# Patient Record
Sex: MALE | Race: NATIVE HAWAIIAN OR OTHER PACIFIC ISLANDER | Employment: OTHER | URBAN - METROPOLITAN AREA
[De-identification: names, ages, dates, MRNs, and addresses within clinical notes are randomized per-mention and may not be internally consistent; named-entity substitution may affect disease eponyms.]

---

## 2017-09-26 ENCOUNTER — OP HISTORICAL/CONVERTED ENCOUNTER (OUTPATIENT)
Dept: OTHER | Age: 67
End: 2017-09-26

## 2018-09-17 ENCOUNTER — OP HISTORICAL/CONVERTED ENCOUNTER (OUTPATIENT)
Dept: OTHER | Age: 68
End: 2018-09-17

## 2019-05-28 ENCOUNTER — OP HISTORICAL/CONVERTED ENCOUNTER (OUTPATIENT)
Dept: OTHER | Age: 69
End: 2019-05-28

## 2019-06-11 ENCOUNTER — OP HISTORICAL/CONVERTED ENCOUNTER (OUTPATIENT)
Dept: OTHER | Age: 69
End: 2019-06-11

## 2019-09-17 ENCOUNTER — OP HISTORICAL/CONVERTED ENCOUNTER (OUTPATIENT)
Dept: OTHER | Age: 69
End: 2019-09-17

## 2019-09-30 ENCOUNTER — OP HISTORICAL/CONVERTED ENCOUNTER (OUTPATIENT)
Dept: OTHER | Age: 69
End: 2019-09-30

## 2019-10-03 ENCOUNTER — OP HISTORICAL/CONVERTED ENCOUNTER (OUTPATIENT)
Dept: OTHER | Age: 69
End: 2019-10-03

## 2020-07-16 VITALS
WEIGHT: 166 LBS | TEMPERATURE: 98.4 F | DIASTOLIC BLOOD PRESSURE: 70 MMHG | SYSTOLIC BLOOD PRESSURE: 142 MMHG | BODY MASS INDEX: 26.68 KG/M2 | HEIGHT: 66 IN

## 2020-07-16 DIAGNOSIS — R32 URINARY INCONTINENCE, UNSPECIFIED TYPE: ICD-10-CM

## 2020-07-16 DIAGNOSIS — N52.9 IMPOTENCE: ICD-10-CM

## 2020-07-16 DIAGNOSIS — C61 MALIGNANT NEOPLASM OF PROSTATE (HCC): ICD-10-CM

## 2020-07-16 DIAGNOSIS — G45.9 INTERMITTENT CEREBRAL ISCHEMIA: ICD-10-CM

## 2020-07-16 RX ORDER — LISINOPRIL 20 MG/1
TABLET ORAL DAILY
COMMUNITY

## 2020-07-16 RX ORDER — ATENOLOL 25 MG/1
25 TABLET ORAL DAILY
COMMUNITY

## 2020-07-16 RX ORDER — ATORVASTATIN CALCIUM 10 MG/1
TABLET, FILM COATED ORAL DAILY
COMMUNITY

## 2020-07-16 RX ORDER — AMLODIPINE BESYLATE 10 MG/1
TABLET ORAL DAILY
COMMUNITY

## 2020-10-27 ENCOUNTER — OFFICE VISIT (OUTPATIENT)
Dept: UROLOGY | Age: 70
End: 2020-10-27
Payer: MEDICARE

## 2020-10-27 VITALS — WEIGHT: 169 LBS | BODY MASS INDEX: 27.16 KG/M2 | TEMPERATURE: 99 F | HEIGHT: 66 IN

## 2020-10-27 DIAGNOSIS — N52.9 IMPOTENCE: ICD-10-CM

## 2020-10-27 DIAGNOSIS — R32 URINARY INCONTINENCE, UNSPECIFIED TYPE: ICD-10-CM

## 2020-10-27 DIAGNOSIS — C61 MALIGNANT NEOPLASM OF PROSTATE (HCC): Primary | ICD-10-CM

## 2020-10-27 LAB — PSA SERPL-MCNC: <0.1 NG/ML (ref 0–4)

## 2020-10-27 PROCEDURE — G8427 DOCREV CUR MEDS BY ELIG CLIN: HCPCS | Performed by: UROLOGY

## 2020-10-27 PROCEDURE — 99214 OFFICE O/P EST MOD 30 MIN: CPT | Performed by: UROLOGY

## 2020-10-27 NOTE — PROGRESS NOTES
HISTORY OF PRESENT ILLNESS  Lenin Cheung is a [address] y.o. male. Chief Complaint   Patient presents with    Follow-up    Prostate Cancer    Urinary Incontinence     He is s/p prostatectomy on 9/30/19. [place] 4+3, 7 disease. Negative margins, negative nodes. PSA has been undetectable post-op, last drawn 6/8/2020. Urine control has been good. He can still leak a few drops and does not avoid the urge. He will leak a full amount if he \"waits too long. \"    He is interested in sexual function. His wife has has reservations until his urine control was improved. However, he is ready to discuss. Chronic Conditions Addressed Today     1. Impotence     Overview      He is interested in sexual function but his wife is not. She is prone to UTI and does not want to engage in intercourse when/if he is still leaking urine. Current Assessment & Plan      He is s/p prostatectomy. He is becoming more interested in sexual function. His wife expressed some concern with his urinary leakage, but his control is much improved. He was given information on a LENKA         2. Malignant neoplasm of prostate [facility]) - Primary     Overview      He is s/p prostatectomy on 9/30/19. [place] 4+3, 7 disease. Negative margins, negative nodes. PSA has been undetectable post-op, last drawn 6/8/2020. Current Assessment & Plan      He is s/p prostatectomy on 9/30/19. [place] 4+3, 7 disease. Negative margins, negative nodes. PSA has been undetectable post-op, last drawn 6/8/2020. PSA from yesterday is still pending. Follow up in 6 months with PSA at that time. Relevant Orders     PSA, DIAGNOSTIC (PROSTATE SPECIFIC AG)    3. Unspecified urinary incontinence     Overview      He is s/p prostatectomy and is doing his kegels. He has great control. He no longer wears protective undergarments. He can leak on occasion overnight. It is a small volume.             Current Assessment & Plan      He is s/p prostatectomy. He does not wear protective underwear. He can have a small leak at times, but does not avoid the urge. He is advised to continue Kegels. Review of Systems   Gastrointestinal:        Per HPI   Genitourinary:        Per HPI   All other systems reviewed and are negative. Past Medical History:   Diagnosis Date    Hypertension     Impotence 7/16/2020    He is interested in sexual function but his wife is not. She is prone to UTI and does not want to engage in intercourse when/if he is still leaking urine.  Intermittent cerebral ischemia 7/16/2020    He was [facility] to [facility] on 10/3/19 following episodes facial droop, slurred speech, one-sided weakness. Originally, it was thought that he was having a response to Cipro. It was later determined that he was in fact, having TIA's. THese episodes had subsided by his November, 2019 visit. He is no longer having facial droop, slurred speech or unilateral weakness. He is followed by Dr. Lior Mccormick and Dr. Dario Early Malignant neoplasm of prostate [facility]) 7/16/2020    He is s/p prostatectomy on 9/30/19. [place] 4+3, 7 disease. Negative margins, negative nodes. PSA has been undetectable post-op, last drawn 6/8/2020.  Unspecified urinary incontinence 7/16/2020    He is s/p prostatectomy reports doing his kegels and good urine control with small volume leakage overnight. His wife states that is incorrect information and that he does not do kegels and continues to leak urine and wear protective undergarments. Past Surgical History:   Procedure Laterality Date    HX PROSTATECTOMY  09/30/2019    HX UROLOGICAL  09/26/2017    RPG    HX UROLOGICAL  05/28/2019    URETEROSCOPY    HX UROLOGICAL  05/28/2019    RPG    HX WISDOM TEETH EXTRACTION      OH PROSTATE BIOPSY, NEEDLE, SATURATION SAMPLING  05/28/2019    OH PROSTATE BIOPSY, NEEDLE, SATURATION SAMPLING  09/26/2017     History reviewed. No pertinent family history.      Physical Exam  Vitals signs reviewed. Constitutional:       Appearance: He is obese. HENT:      Head: Normocephalic and atraumatic. Mouth/Throat:      Mouth: Mucous membranes are moist.   Eyes:      Extraocular Movements: Extraocular movements intact. Conjunctiva/sclera: Conjunctivae normal.      Pupils: Pupils are equal, round, and reactive to light. Neck:      Musculoskeletal: Normal range of motion. Cardiovascular:      Rate and Rhythm: Normal rate. Pulmonary:      Effort: Pulmonary effort is normal.   Abdominal:      General: There is no distension. Palpations: Abdomen is soft. Musculoskeletal: Normal range of motion. Skin:     General: Skin is warm and dry. Neurological:      General: No focal deficit present. Mental Status: He is alert and oriented to person, place, and time. Mental status is at baseline. Psychiatric:         Mood and Affect: Mood normal.         Behavior: Behavior normal.         Thought Content: Thought content normal.         Judgment: Judgment normal.                     ASSESSMENT and PLAN  Diagnoses and all orders for this visit:    1. Malignant neoplasm of prostate [facility])  Assessment & Plan:  He is s/p prostatectomy on 9/30/19. [place] 4+3, 7 disease. Negative margins, negative nodes. PSA has been undetectable post-op, last drawn 6/8/2020. PSA from yesterday is still pending. Follow up in 6 months with PSA at that time. Orders:  -     PSA, DIAGNOSTIC (PROSTATE SPECIFIC AG); Future    2. Urinary incontinence, unspecified type  Assessment & Plan:  He is s/p prostatectomy. He does not wear protective underwear. He can have a small leak at times, but does not avoid the urge. He is advised to continue Kegels. 3. Impotence  Assessment & Plan:  He is s/p prostatectomy. He is becoming more interested in sexual function. His wife expressed some concern with his urinary leakage, but his control is much improved.    He was given information on a LENKA Follow-up and Dispositions    · Return in about 6 months (around 4/27/2021) for PSA prior.          Gorge De La Rosa MD

## 2020-10-27 NOTE — ASSESSMENT & PLAN NOTE
He is s/p prostatectomy on 9/30/19. [place] 4+3, 7 disease. Negative margins, negative nodes. PSA has been undetectable post-op, last drawn 6/8/2020. PSA from yesterday is still pending. Follow up in 6 months with PSA at that time.

## 2020-10-27 NOTE — ASSESSMENT & PLAN NOTE
He is s/p prostatectomy. He is becoming more interested in sexual function. His wife expressed some concern with his urinary leakage, but his control is much improved.    He was given information on a LENKA

## 2021-01-27 DIAGNOSIS — C61 MALIGNANT NEOPLASM OF PROSTATE (HCC): ICD-10-CM

## 2021-03-25 ENCOUNTER — HOSPITAL ENCOUNTER (OUTPATIENT)
Dept: GENERAL RADIOLOGY | Age: 71
Discharge: HOME | End: 2021-03-25
Payer: MEDICARE

## 2021-03-25 ENCOUNTER — TRANSCRIBE ORDER (OUTPATIENT)
Dept: REGISTRATION | Age: 71
End: 2021-03-25

## 2021-03-25 DIAGNOSIS — R05.9 COUGH: ICD-10-CM

## 2021-03-25 DIAGNOSIS — R05.9 COUGH: Primary | ICD-10-CM

## 2021-03-25 PROCEDURE — 71046 X-RAY EXAM CHEST 2 VIEWS: CPT

## 2021-04-26 ENCOUNTER — TELEPHONE (OUTPATIENT)
Dept: UROLOGY | Age: 71
End: 2021-04-26

## 2021-04-27 ENCOUNTER — OFFICE VISIT (OUTPATIENT)
Dept: UROLOGY | Age: 71
End: 2021-04-27
Payer: MEDICARE

## 2021-04-27 VITALS
RESPIRATION RATE: 22 BRPM | SYSTOLIC BLOOD PRESSURE: 150 MMHG | HEIGHT: 66 IN | DIASTOLIC BLOOD PRESSURE: 71 MMHG | TEMPERATURE: 98.1 F | BODY MASS INDEX: 27.32 KG/M2 | WEIGHT: 170 LBS | OXYGEN SATURATION: 98 % | HEART RATE: 60 BPM

## 2021-04-27 DIAGNOSIS — R32 URINARY INCONTINENCE, UNSPECIFIED TYPE: ICD-10-CM

## 2021-04-27 DIAGNOSIS — C61 MALIGNANT NEOPLASM OF PROSTATE (HCC): Primary | ICD-10-CM

## 2021-04-27 DIAGNOSIS — N52.9 IMPOTENCE: ICD-10-CM

## 2021-04-27 LAB
BILIRUB UR QL STRIP: NEGATIVE
GLUCOSE UR-MCNC: NEGATIVE MG/DL
KETONES P FAST UR STRIP-MCNC: NEGATIVE MG/DL
PH UR STRIP: 6 [PH] (ref 4.6–8)
PROT UR QL STRIP: NORMAL
PSA SERPL-MCNC: <0.1 NG/ML (ref 0–4)
SP GR UR STRIP: 1.03 (ref 1–1.03)
UA UROBILINOGEN AMB POC: NORMAL (ref 0.2–1)
URINALYSIS CLARITY POC: CLEAR
URINALYSIS COLOR POC: YELLOW
URINE BLOOD POC: NEGATIVE
URINE LEUKOCYTES POC: NEGATIVE
URINE NITRITES POC: NEGATIVE

## 2021-04-27 PROCEDURE — G8419 CALC BMI OUT NRM PARAM NOF/U: HCPCS | Performed by: UROLOGY

## 2021-04-27 PROCEDURE — 99213 OFFICE O/P EST LOW 20 MIN: CPT | Performed by: UROLOGY

## 2021-04-27 PROCEDURE — G8432 DEP SCR NOT DOC, RNG: HCPCS | Performed by: UROLOGY

## 2021-04-27 PROCEDURE — 81003 URINALYSIS AUTO W/O SCOPE: CPT | Performed by: UROLOGY

## 2021-04-27 PROCEDURE — G8427 DOCREV CUR MEDS BY ELIG CLIN: HCPCS | Performed by: UROLOGY

## 2021-04-27 PROCEDURE — G8536 NO DOC ELDER MAL SCRN: HCPCS | Performed by: UROLOGY

## 2021-04-27 NOTE — PROGRESS NOTES
1. Have you been to the ER, urgent care clinic since your last visit? Hospitalized since your last visit? No    2. Have you seen or consulted any other health care providers outside of the [address] [place] [address] since your last visit? Include any pap smears or colon screening.  PCP  Chief Complaint   Patient presents with    Follow-up    Prostate Cancer     Visit Vitals  BP (!) 150/71 (BP 1 Location: Left arm, BP Patient Position: Sitting, BP Cuff Size: Adult)   Pulse 60   Temp 98.1 °F (36.7 °C) (Temporal)   Resp 22   Ht 5' 6\" (1.676 m)   Wt 170 lb (77.1 kg)   SpO2 98%   BMI 27.44 kg/m²

## 2021-04-27 NOTE — ASSESSMENT & PLAN NOTE
Small volume urine leaking; intermittently. He is not bothered. He is advised to do a Kegel when bending or lifting.

## 2021-06-17 ENCOUNTER — TELEPHONE (OUTPATIENT)
Dept: UROLOGY | Age: 71
End: 2021-06-17

## 2021-06-17 NOTE — TELEPHONE ENCOUNTER
Answered the wife question. Yes she can have intercourse with  even though he is leaking urine. They may use a condom for this process.

## 2021-10-19 ENCOUNTER — TELEPHONE (OUTPATIENT)
Dept: UROLOGY | Age: 71
End: 2021-10-19

## 2021-10-26 LAB — PSA SERPL-MCNC: <0.1 NG/ML (ref 0–4)

## 2021-10-27 DIAGNOSIS — C61 MALIGNANT NEOPLASM OF PROSTATE (HCC): ICD-10-CM

## 2022-03-18 PROBLEM — R32 UNSPECIFIED URINARY INCONTINENCE: Status: ACTIVE | Noted: 2020-07-16

## 2022-03-19 PROBLEM — G45.9 INTERMITTENT CEREBRAL ISCHEMIA: Status: ACTIVE | Noted: 2020-07-16

## 2022-03-19 PROBLEM — C61 MALIGNANT NEOPLASM OF PROSTATE (HCC): Status: ACTIVE | Noted: 2020-07-16

## 2022-03-20 PROBLEM — N52.9 IMPOTENCE: Status: ACTIVE | Noted: 2020-07-16

## 2022-05-03 LAB — PSA SERPL-MCNC: <0.1 NG/ML (ref 0–4)

## 2022-05-06 ENCOUNTER — TELEPHONE (OUTPATIENT)
Dept: UROLOGY | Age: 72
End: 2022-05-06

## 2022-05-06 NOTE — TELEPHONE ENCOUNTER
Spoke with patient wife who wanted to know if pt psa results came back I let her know they have but the doctor has not reviewed them yet .  I let her know once he does someone will call them to let them know

## 2022-07-13 ENCOUNTER — TELEPHONE (OUTPATIENT)
Dept: UROLOGY | Age: 72
End: 2022-07-13

## 2022-07-13 NOTE — TELEPHONE ENCOUNTER
Patients wife called stating she was talking to someone yesterday about 's lab order , she provided me with fax number  for [facility] so we can fax it over, however the only order I see is from 2020?   pls advise

## 2022-07-15 ENCOUNTER — OFFICE VISIT (OUTPATIENT)
Dept: UROLOGY | Age: 72
End: 2022-07-15
Payer: MEDICARE

## 2022-07-15 VITALS
RESPIRATION RATE: 16 BRPM | HEIGHT: 66 IN | WEIGHT: 170 LBS | DIASTOLIC BLOOD PRESSURE: 70 MMHG | SYSTOLIC BLOOD PRESSURE: 143 MMHG | BODY MASS INDEX: 27.32 KG/M2 | TEMPERATURE: 98.6 F | OXYGEN SATURATION: 98 % | HEART RATE: 58 BPM

## 2022-07-15 DIAGNOSIS — R32 URINARY INCONTINENCE, UNSPECIFIED TYPE: ICD-10-CM

## 2022-07-15 DIAGNOSIS — N52.9 IMPOTENCE: ICD-10-CM

## 2022-07-15 DIAGNOSIS — C61 MALIGNANT NEOPLASM OF PROSTATE (HCC): ICD-10-CM

## 2022-07-15 LAB
BILIRUB UR QL: NEGATIVE
GLUCOSE UR-MCNC: NEGATIVE MG/DL
KETONES P FAST UR STRIP-MCNC: NEGATIVE MG/DL
PH UR STRIP: 5.5 [PH] (ref 4.6–8)
PROT UR QL STRIP: NEGATIVE
PSA SERPL-MCNC: <0.1 NG/ML (ref 0–4)
SP GR UR STRIP: 1.01 (ref 1–1.03)
UA UROBILINOGEN AMB POC: NORMAL (ref 0.2–1)
URINALYSIS CLARITY POC: CLEAR
URINALYSIS COLOR POC: YELLOW
URINE BLOOD POC: NEGATIVE
URINE LEUKOCYTES POC: NEGATIVE
URINE NITRITES POC: NEGATIVE

## 2022-07-15 PROCEDURE — 99213 OFFICE O/P EST LOW 20 MIN: CPT | Performed by: UROLOGY

## 2022-07-15 PROCEDURE — G8427 DOCREV CUR MEDS BY ELIG CLIN: HCPCS | Performed by: UROLOGY

## 2022-07-15 PROCEDURE — G8536 NO DOC ELDER MAL SCRN: HCPCS | Performed by: UROLOGY

## 2022-07-15 PROCEDURE — G8510 SCR DEP NEG, NO PLAN REQD: HCPCS | Performed by: UROLOGY

## 2022-07-15 PROCEDURE — G8417 CALC BMI ABV UP PARAM F/U: HCPCS | Performed by: UROLOGY

## 2022-07-15 PROCEDURE — 1123F ACP DISCUSS/DSCN MKR DOCD: CPT | Performed by: UROLOGY

## 2022-07-15 PROCEDURE — 81003 URINALYSIS AUTO W/O SCOPE: CPT | Performed by: UROLOGY

## 2022-07-15 PROCEDURE — 3017F COLORECTAL CA SCREEN DOC REV: CPT | Performed by: UROLOGY

## 2022-07-15 PROCEDURE — 1101F PT FALLS ASSESS-DOCD LE1/YR: CPT | Performed by: UROLOGY

## 2022-07-15 NOTE — LETTER
7/15/2022    Patient: Tj Jordan   YOB: 1950   Date of Visit: 7/15/2022     Nayely Renteria MD  [facility] [address]  [address] [facility] [address]    Dear Nayely Renteria MD,      Thank you for referring Mr. Anibal Cobb to [place] [address] for evaluation. My notes for this consultation are attached. If you have questions, please do not hesitate to call me. I look forward to following your patient along with you.       Sincerely,    Patricio Mullen MD

## 2022-07-15 NOTE — ASSESSMENT & PLAN NOTE
He is s/p prostatectomy on 9/30/19. PSA has been undetectable. Follow up in 6 months with PSA prior.

## 2022-07-15 NOTE — PROGRESS NOTES
Chief Complaint   Patient presents with    Follow-up    Prostate Cancer    Erectile Dysfunction    Enuresis       PHQ-9 score is    Negative    Vitals:    07/15/22 1057   BP: (!) 143/70   Pulse: (!) 58   Resp: 16   Temp: 98.6 °F (37 °C)   TempSrc: Temporal   SpO2: 98%   Weight: 170 lb (77.1 kg)   Height: 5' 6\" (1.676 m)      1. \"Have you been to the ER, urgent care clinic since your last visit? Hospitalized since your last visit? \" No    2. \"Have you seen or consulted any other health care providers outside of the [address] [place] [address] since your last visit? \" No     3. For patients aged 39-70: Has the patient had a colonoscopy / FIT/ Cologuard? Yes - no Care Gap present      If the patient is female:    4. For patients aged 41-77: Has the patient had a mammogram within the past 2 years? NA - based on age or sex      11. For patients aged 21-65: Has the patient had a pap smear?  NA - based on age or sex

## 2023-01-15 DIAGNOSIS — C61 MALIGNANT NEOPLASM OF PROSTATE (HCC): ICD-10-CM

## 2023-01-18 ENCOUNTER — TELEPHONE (OUTPATIENT)
Dept: UROLOGY | Age: 73
End: 2023-01-18

## 2023-01-24 ENCOUNTER — TELEPHONE (OUTPATIENT)
Dept: UROLOGY | Age: 73
End: 2023-01-24

## 2023-01-24 NOTE — TELEPHONE ENCOUNTER
PT CALLED IN REGARDS TO LAB WORK RESULTS   CAN A NURSE GIVE PT A CALL  PLEASE ADVISE     [phone]

## 2023-03-22 NOTE — ASSESSMENT & PLAN NOTE
He is s/p prostatectomy on 9/30/19. PSA has been undetectable post op. Follow up in 6 months with PSA prior.

## 2023-03-22 NOTE — PROGRESS NOTES
HISTORY OF PRESENT ILLNESS  Buster Sotelo is a 67 y.o. male. No chief complaint on file. Past Medical History:  PMHx (including negatives):  has a past medical history of Hypertension, Impotence (7/16/2020), Intermittent cerebral ischemia (7/16/2020), Malignant neoplasm of prostate ([facility] [address] (7/16/2020), and Unspecified urinary incontinence (7/16/2020). PSurgHx:  has a past surgical history that includes hx wisdom teeth extraction; hx prostatectomy (09/30/2019); pr prostate biopsy, needle, saturation sampling (05/28/2019); pr prostate biopsy, needle, saturation sampling (09/26/2017); hx urological (09/26/2017); hx urological (05/28/2019); and hx urological (05/28/2019). PSocHx:  reports that he has quit smoking. He has never used smokeless tobacco. He reports that he does not currently use alcohol. He reports that he does not use drugs. He is s/p prostatectomy on 9/30/19. [place] 4+3, 7 disease. Negative margins, negative nodes. PSA has been undetectable post-op. He had small volume leakage. He is not bothered. Nocturia x 1. Sexual function as of declining importance. He has been advised on LENKA. Chronic Conditions Addressed Today       1. Impotence     Overview      He is interested in sexual function but his wife is not overly concerned. He has been provided with information re: LENKA. 2. Malignant neoplasm of prostate [facility]) - Primary     Overview      He is s/p prostatectomy on 9/30/19. [place] 4+3, 7 disease. Negative margins, negative nodes. PSA has been undetectable post-op, last drawn 1/19/23. Current Assessment & Plan      He is s/p prostatectomy on 9/30/19. PSA has been undetectable post op. Follow up in 6 months with PSA prior. 3. Unspecified urinary incontinence     Overview      He is s/p prostatectomy. He has small volume leakage which is intermittent. He is not bothered. Nocturia x 1.                    ROS  Patient denies the symptoms of COVID-19 per routine screening guidelines. Physical Exam    ASSESSMENT and PLAN  Diagnoses and all orders for this visit:    1. Malignant neoplasm of prostate [facility])  Assessment & Plan:  He is s/p prostatectomy on 9/30/19. PSA has been undetectable post op. Follow up in 6 months with PSA prior. 2. Impotence    3. Urinary incontinence, unspecified type               Gamal Rice may have a reminder for a \"due or due soon\" health maintenance. The patient has been encouraged to contact their primary care provider for follow-up on this health maintenance or other necessary and/or routine health screening. Please note that portions of this note were completed with Dragon dictation, the computer voice recognition software. Quite often unanticipated grammatical, syntax, homophones, and other interpretive errors are inadvertently transcribed by the computer software. Please disregard these errors and any other errors that may have escaped final proofreading. Thank you.

## 2023-03-28 ENCOUNTER — OFFICE VISIT (OUTPATIENT)
Dept: UROLOGY | Age: 73
End: 2023-03-28
Payer: MEDICARE

## 2023-03-28 VITALS — WEIGHT: 170 LBS | HEIGHT: 66 IN | BODY MASS INDEX: 27.32 KG/M2

## 2023-03-28 DIAGNOSIS — N52.9 IMPOTENCE: ICD-10-CM

## 2023-03-28 DIAGNOSIS — C61 MALIGNANT NEOPLASM OF PROSTATE (HCC): Primary | ICD-10-CM

## 2023-03-28 DIAGNOSIS — R32 URINARY INCONTINENCE, UNSPECIFIED TYPE: ICD-10-CM

## 2023-03-28 LAB
BILIRUB UR QL: NEGATIVE
GLUCOSE UR-MCNC: 100 MG/DL
KETONES P FAST UR STRIP-MCNC: NEGATIVE MG/DL
PH UR STRIP: 5.5 [PH] (ref 4.6–8)
PROT UR QL STRIP: NEGATIVE
SP GR UR STRIP: 1 (ref 1–1.03)
UA UROBILINOGEN AMB POC: NORMAL (ref 0.2–1)
URINALYSIS CLARITY POC: CLEAR
URINALYSIS COLOR POC: YELLOW
URINE BLOOD POC: NEGATIVE
URINE LEUKOCYTES POC: NEGATIVE
URINE NITRITES POC: NEGATIVE

## 2023-03-28 PROCEDURE — G8536 NO DOC ELDER MAL SCRN: HCPCS | Performed by: UROLOGY

## 2023-03-28 PROCEDURE — 99213 OFFICE O/P EST LOW 20 MIN: CPT | Performed by: UROLOGY

## 2023-03-28 PROCEDURE — 1123F ACP DISCUSS/DSCN MKR DOCD: CPT | Performed by: UROLOGY

## 2023-03-28 PROCEDURE — G8427 DOCREV CUR MEDS BY ELIG CLIN: HCPCS | Performed by: UROLOGY

## 2023-03-28 PROCEDURE — G8417 CALC BMI ABV UP PARAM F/U: HCPCS | Performed by: UROLOGY

## 2023-03-28 PROCEDURE — 3017F COLORECTAL CA SCREEN DOC REV: CPT | Performed by: UROLOGY

## 2023-03-28 PROCEDURE — 1101F PT FALLS ASSESS-DOCD LE1/YR: CPT | Performed by: UROLOGY

## 2023-03-28 PROCEDURE — 81003 URINALYSIS AUTO W/O SCOPE: CPT | Performed by: UROLOGY

## 2023-03-28 PROCEDURE — G8432 DEP SCR NOT DOC, RNG: HCPCS | Performed by: UROLOGY

## 2023-03-28 NOTE — PROGRESS NOTES
Chief Complaint   Patient presents with    Follow-up    Prostate Cancer    Erectile Dysfunction    Urinary Incontinence     1. Have you been to the ER, urgent care clinic since your last visit? Hospitalized since your last visit? No    2. Have you seen or consulted any other health care providers outside of the [address] [place] [address] since your last visit? Include any pap smears or colon screening.  No  Visit Vitals  Ht 5' 6\" (1.676 m)   Wt 170 lb (77.1 kg)   BMI 27.44 kg/m²

## 2023-04-23 DIAGNOSIS — C61 MALIGNANT NEOPLASM OF PROSTATE (HCC): Primary | ICD-10-CM

## 2023-05-18 RX ORDER — AMLODIPINE BESYLATE 10 MG/1
TABLET ORAL DAILY
COMMUNITY

## 2023-05-18 RX ORDER — LISINOPRIL 20 MG/1
TABLET ORAL DAILY
COMMUNITY

## 2023-05-18 RX ORDER — ATORVASTATIN CALCIUM 10 MG/1
TABLET, FILM COATED ORAL DAILY
COMMUNITY

## 2023-05-18 RX ORDER — ATENOLOL 25 MG/1
25 TABLET ORAL DAILY
COMMUNITY

## 2023-09-28 DIAGNOSIS — C61 MALIGNANT NEOPLASM OF PROSTATE (HCC): ICD-10-CM

## 2023-10-04 PROBLEM — C61 MALIGNANT NEOPLASM OF PROSTATE (HCC): Status: ACTIVE | Noted: 2020-07-16

## 2023-10-04 PROBLEM — N52.9 IMPOTENCE: Status: ACTIVE | Noted: 2020-07-16

## 2023-10-04 ASSESSMENT — ENCOUNTER SYMPTOMS
VOMITING: 0
SHORTNESS OF BREATH: 0
NAUSEA: 0
CONSTIPATION: 0
DIARRHEA: 0

## 2023-10-04 NOTE — ASSESSMENT & PLAN NOTE
No bother. He has mild leakage and has started working on Yuri Javier again. He declines referral for pelvic floor therapy. He is up once at night, no concerns.

## 2023-10-05 ENCOUNTER — OFFICE VISIT (OUTPATIENT)
Age: 73
End: 2023-10-05
Payer: MEDICARE

## 2023-10-05 VITALS
HEIGHT: 66 IN | SYSTOLIC BLOOD PRESSURE: 139 MMHG | OXYGEN SATURATION: 100 % | DIASTOLIC BLOOD PRESSURE: 67 MMHG | HEART RATE: 65 BPM | BODY MASS INDEX: 27.8 KG/M2 | WEIGHT: 173 LBS | TEMPERATURE: 97.8 F

## 2023-10-05 DIAGNOSIS — C61 MALIGNANT NEOPLASM OF PROSTATE (HCC): ICD-10-CM

## 2023-10-05 DIAGNOSIS — R32 URINARY INCONTINENCE, UNSPECIFIED TYPE: Primary | ICD-10-CM

## 2023-10-05 DIAGNOSIS — N52.9 IMPOTENCE: ICD-10-CM

## 2023-10-05 LAB
BILIRUBIN, URINE, POC: NORMAL
BLOOD URINE, POC: NEGATIVE
GLUCOSE URINE, POC: NEGATIVE
KETONES, URINE, POC: NORMAL
LEUKOCYTE ESTERASE, URINE, POC: NEGATIVE
NITRITE, URINE, POC: NEGATIVE
PH, URINE, POC: 5.5 (ref 4.6–8)
PROTEIN,URINE, POC: NORMAL
SPECIFIC GRAVITY, URINE, POC: 1.02 (ref 1–1.03)
URINALYSIS CLARITY, POC: CLEAR
URINALYSIS COLOR, POC: YELLOW
UROBILINOGEN, POC: NORMAL

## 2023-10-05 PROCEDURE — 1036F TOBACCO NON-USER: CPT | Performed by: NURSE PRACTITIONER

## 2023-10-05 PROCEDURE — 1123F ACP DISCUSS/DSCN MKR DOCD: CPT | Performed by: NURSE PRACTITIONER

## 2023-10-05 PROCEDURE — G8427 DOCREV CUR MEDS BY ELIG CLIN: HCPCS | Performed by: NURSE PRACTITIONER

## 2023-10-05 PROCEDURE — 99212 OFFICE O/P EST SF 10 MIN: CPT | Performed by: NURSE PRACTITIONER

## 2023-10-05 PROCEDURE — 3017F COLORECTAL CA SCREEN DOC REV: CPT | Performed by: NURSE PRACTITIONER

## 2023-10-05 PROCEDURE — G8484 FLU IMMUNIZE NO ADMIN: HCPCS | Performed by: NURSE PRACTITIONER

## 2023-10-05 PROCEDURE — 81003 URINALYSIS AUTO W/O SCOPE: CPT | Performed by: NURSE PRACTITIONER

## 2023-10-05 PROCEDURE — G8419 CALC BMI OUT NRM PARAM NOF/U: HCPCS | Performed by: NURSE PRACTITIONER

## 2023-10-07 LAB — PSA SERPL-MCNC: <0.1 NG/ML (ref 0–4)

## 2024-04-01 PROBLEM — R32 UNSPECIFIED URINARY INCONTINENCE: Status: RESOLVED | Noted: 2020-07-16 | Resolved: 2024-04-01

## 2024-04-01 PROBLEM — N52.9 IMPOTENCE: Status: RESOLVED | Noted: 2020-07-16 | Resolved: 2024-04-01

## 2024-04-01 NOTE — PROGRESS NOTES
HISTORY OF PRESENT ILLNESS  Michel Ferris is a 73 y.o. male.  Chief Complaint   Patient presents with    Follow-up    Prostate Cancer     He is s/p prostatectomy on 9/30/19. [place] 4+3, 7 disease. Negative margins, negative nodes. PSA has been undetectable as of 4/4/24.    He is approaching 5 years.    Urine control adequate; he declined referral to pelvic floor therapy.    Sexual function of waning importance.    He is doing well overall, has no complaints.  He is very happy and healthy.    PAST MEDICAL HISTORY:  PMHx (including negatives):  has a past medical history of Hypertension, Impotence, Intermittent cerebral ischemia, Malignant neoplasm of prostate (HCC), and Unspecified urinary incontinence.   PSurgHx:  has a past surgical history that includes Urological Surgery (09/26/2017); prostate biopsy, needle, saturation sampling (09/26/2017); Urological Surgery (05/28/2019); prostate biopsy, needle, saturation sampling (05/28/2019); Prostatectomy (09/30/2019); [place] tooth extraction; and Urological Surgery (05/28/2019).  PSocHx:  reports that he has quit smoking. He has never used smokeless tobacco. He reports that he does not currently use alcohol. He reports that he does not use drugs.     Review of Systems   Constitutional:  Negative for activity change, appetite change, chills, fatigue, fever and unexpected weight change.   Gastrointestinal:  Negative for diarrhea, nausea and vomiting.   Genitourinary:  Negative for difficulty urinating.   All other systems reviewed and are negative.      Physical Exam  Vitals reviewed.   Constitutional:       General: He is not in acute distress.     Appearance: He is not ill-appearing.   Eyes:      Extraocular Movements: Extraocular movements intact.   Pulmonary:      Effort: Pulmonary effort is normal.   Musculoskeletal:         General: Normal range of motion.      Cervical back: Normal range of motion.   Neurological:      Mental Status: He is alert and oriented

## 2024-04-01 NOTE — ASSESSMENT & PLAN NOTE
He is s/p prostatectomy in 2019.  He is approaching his 5 year roseanne with undetectable PSA values.  Repeat and follow up in 6 months, then transition to yearly visits with PSA.

## 2024-04-04 DIAGNOSIS — C61 MALIGNANT NEOPLASM OF PROSTATE (HCC): ICD-10-CM

## 2024-04-05 ENCOUNTER — OFFICE VISIT (OUTPATIENT)
Age: 74
End: 2024-04-05
Payer: MEDICARE

## 2024-04-05 VITALS — BODY MASS INDEX: 27.8 KG/M2 | WEIGHT: 173 LBS | HEIGHT: 66 IN

## 2024-04-05 DIAGNOSIS — C61 MALIGNANT NEOPLASM OF PROSTATE (HCC): Primary | ICD-10-CM

## 2024-04-05 LAB — PSA SERPL-MCNC: <0.1 NG/ML (ref 0–4)

## 2024-04-05 PROCEDURE — G8427 DOCREV CUR MEDS BY ELIG CLIN: HCPCS | Performed by: NURSE PRACTITIONER

## 2024-04-05 PROCEDURE — 1123F ACP DISCUSS/DSCN MKR DOCD: CPT | Performed by: NURSE PRACTITIONER

## 2024-04-05 PROCEDURE — G8419 CALC BMI OUT NRM PARAM NOF/U: HCPCS | Performed by: NURSE PRACTITIONER

## 2024-04-05 PROCEDURE — 3017F COLORECTAL CA SCREEN DOC REV: CPT | Performed by: NURSE PRACTITIONER

## 2024-04-05 PROCEDURE — 99212 OFFICE O/P EST SF 10 MIN: CPT | Performed by: NURSE PRACTITIONER

## 2024-04-05 PROCEDURE — 1036F TOBACCO NON-USER: CPT | Performed by: NURSE PRACTITIONER

## 2024-04-05 ASSESSMENT — ENCOUNTER SYMPTOMS
VOMITING: 0
DIARRHEA: 0
NAUSEA: 0

## 2024-10-01 DIAGNOSIS — C61 MALIGNANT NEOPLASM OF PROSTATE (HCC): ICD-10-CM

## 2024-10-07 NOTE — PROGRESS NOTES
HISTORY OF PRESENT ILLNESS  Michel Ferris is a 74 y.o. male.  Chief Complaint   Patient presents with    Follow-up     6 Month     HPI    He is s/p prostatectomy on 9/30/19. [place] 4+3, 7 disease. Negative margins, negative nodes. PSA has been undetectable.     He is feeling well, doing well.    No urinary complaints.    PAST MEDICAL HISTORY:  PMHx (including negatives):  has a past medical history of Hypertension, Impotence, Intermittent cerebral ischemia, Malignant neoplasm of prostate (HCC), and Unspecified urinary incontinence.   PSurgHx:  has a past surgical history that includes Urological Surgery (09/26/2017); prostate biopsy, needle, saturation sampling (09/26/2017); Urological Surgery (05/28/2019); prostate biopsy, needle, saturation sampling (05/28/2019); Prostatectomy (09/30/2019); [place] tooth extraction; and Urological Surgery (05/28/2019).  PSocHx:  reports that he has quit smoking. He has never used smokeless tobacco. He reports that he does not currently use alcohol. He reports that he does not use drugs.     Review of Systems   All other systems reviewed and are negative.        Physical Exam  Vitals and nursing note reviewed.   Constitutional:       Appearance: He is not ill-appearing.   Eyes:      Extraocular Movements: Extraocular movements intact.   Musculoskeletal:         General: Normal range of motion.   Neurological:      Mental Status: He is oriented to person, place, and time.     ASSESSMENT AND PLAN    Malignant neoplasm of prostate (HCC)  Assessment & Plan:  Stable. He has passed his 5 year roseanne s/p prostatectomy with an undetectable PSA. He can transition to annual PSA checks.    Orders:  -     PSA, Diagnostic; Future        Return in about 1 year (around 10/8/2025) for PSA PRIOR, CAN SEE ANY MEMBER OF THE UROLOGY GROUP.     Kell Dia, APRN - NP      Michel Ferris may have a reminder for a \"due or due soon\" health maintenance. The patient has been encouraged to

## 2024-10-07 NOTE — ASSESSMENT & PLAN NOTE
Stable. He has passed his 5 year roseanne s/p prostatectomy with an undetectable PSA. He can transition to annual PSA checks.

## 2024-10-08 ENCOUNTER — OFFICE VISIT (OUTPATIENT)
Age: 74
End: 2024-10-08
Payer: MEDICARE

## 2024-10-08 VITALS — DIASTOLIC BLOOD PRESSURE: 67 MMHG | HEART RATE: 66 BPM | SYSTOLIC BLOOD PRESSURE: 134 MMHG

## 2024-10-08 DIAGNOSIS — C61 MALIGNANT NEOPLASM OF PROSTATE (HCC): Primary | ICD-10-CM

## 2024-10-08 LAB — PSA SERPL-MCNC: <0.1 NG/ML (ref 0–4)

## 2024-10-08 PROCEDURE — G8419 CALC BMI OUT NRM PARAM NOF/U: HCPCS | Performed by: NURSE PRACTITIONER

## 2024-10-08 PROCEDURE — G8427 DOCREV CUR MEDS BY ELIG CLIN: HCPCS | Performed by: NURSE PRACTITIONER

## 2024-10-08 PROCEDURE — 3017F COLORECTAL CA SCREEN DOC REV: CPT | Performed by: NURSE PRACTITIONER

## 2024-10-08 PROCEDURE — 99212 OFFICE O/P EST SF 10 MIN: CPT | Performed by: NURSE PRACTITIONER

## 2024-10-08 PROCEDURE — G8484 FLU IMMUNIZE NO ADMIN: HCPCS | Performed by: NURSE PRACTITIONER

## 2024-10-08 PROCEDURE — 1123F ACP DISCUSS/DSCN MKR DOCD: CPT | Performed by: NURSE PRACTITIONER

## 2024-10-08 PROCEDURE — 1036F TOBACCO NON-USER: CPT | Performed by: NURSE PRACTITIONER

## 2024-10-08 NOTE — PROGRESS NOTES
Chief Complaint   Patient presents with    Follow-up     6 Month    1. Have you been to the ER, urgent care clinic since your last visit?  Hospitalized since your last visit?No    2. Have you seen or consulted any other health care providers outside of the [facility] since your last visit?  Include any pap smears or colon screening. No  /67 (Site: Right Upper Arm, Position: Sitting, Cuff Size: Medium Adult)   Pulse 66

## 2024-10-31 NOTE — PROGRESS NOTES
HISTORY OF PRESENT ILLNESS  Alexandra Hylton is a 79 y.o. male. Chief Complaint   Patient presents with    Follow-up    Prostate Cancer     He is s/p prostatectomy on 9/30/19. [place] 4+3, 7 disease. Negative margins, negative nodes. PSA has been undetectable post-op, last drawn 4/27/2021. He is doing well. He has an occasional urine leak that is really not bothersome to him. Chronic Conditions Addressed Today     1. Impotence     Overview      He is interested in sexual function but his wife is not. She is prone to UTI and does not want to engage in intercourse when/if he is still leaking urine. 10/27/21: control is improved. LENKA information provided. 2. Malignant neoplasm of prostate [facility]) - Primary     Overview      He is s/p prostatectomy on 9/30/19. [place] 4+3, 7 disease. Negative margins, negative nodes. PSA has been undetectable post-op, last drawn 10/26/2020.         3. Unspecified urinary incontinence     Overview      He is s/p prostatectomy and is doing his kegels. He has great control. He no longer wears protective undergarments. He can leak on occasion overnight. It is a small volume. Review of Systems   Cardiovascular: Negative for chest pain and palpitations. Gastrointestinal: Negative for abdominal pain, constipation, diarrhea, nausea and vomiting. Per HPI   Genitourinary: Negative for dysuria, frequency, hematuria and urgency. Occasional small urine leak; not bothersome. All other systems reviewed and are negative. Physical Exam        ASSESSMENT and PLAN  Diagnoses and all orders for this visit:    1. Malignant neoplasm of prostate [facility])  Assessment & Plan:  He is s/p prostatectomy on 9/30/19. PSA has been undetectable. Repeat in 6 months with f/u after. Orders:  -     PSA, DIAGNOSTIC (PROSTATE SPECIFIC AG); Future  -     AMB POC URINALYSIS DIP STICK AUTO W/O MICRO    2. Impotence  Assessment & Plan:   Chronic ED. Stable. LENKA if activity desired. It is not important to him yet. Orders:  -     AMB POC URINALYSIS DIP STICK AUTO W/O MICRO    3. Urinary incontinence, unspecified type  Assessment & Plan:  Small volume urine leaking; intermittently. He is not bothered. He is advised to do a Kegel when bending or lifting. Follow-up and Dispositions    · Return in about 6 months (around 10/27/2021) for PSA prior, with either provider.          Zeb Hurst MD coffee

## 2025-08-07 ENCOUNTER — OFFICE VISIT (OUTPATIENT)
Age: 75
End: 2025-08-07
Payer: MEDICARE

## 2025-08-07 VITALS
HEIGHT: 66 IN | DIASTOLIC BLOOD PRESSURE: 67 MMHG | WEIGHT: 174.4 LBS | TEMPERATURE: 97.6 F | RESPIRATION RATE: 17 BRPM | OXYGEN SATURATION: 99 % | HEART RATE: 59 BPM | BODY MASS INDEX: 28.03 KG/M2 | SYSTOLIC BLOOD PRESSURE: 137 MMHG

## 2025-08-07 DIAGNOSIS — B18.2 CHRONIC HEPATITIS C WITHOUT HEPATIC COMA (HCC): Primary | ICD-10-CM

## 2025-08-07 DIAGNOSIS — B18.2 CHRONIC HEPATITIS C WITHOUT HEPATIC COMA (HCC): ICD-10-CM

## 2025-08-07 PROCEDURE — 99203 OFFICE O/P NEW LOW 30 MIN: CPT | Performed by: INTERNAL MEDICINE

## 2025-08-07 PROCEDURE — 3017F COLORECTAL CA SCREEN DOC REV: CPT | Performed by: INTERNAL MEDICINE

## 2025-08-07 PROCEDURE — 1160F RVW MEDS BY RX/DR IN RCRD: CPT | Performed by: INTERNAL MEDICINE

## 2025-08-07 PROCEDURE — 1036F TOBACCO NON-USER: CPT | Performed by: INTERNAL MEDICINE

## 2025-08-07 PROCEDURE — G8419 CALC BMI OUT NRM PARAM NOF/U: HCPCS | Performed by: INTERNAL MEDICINE

## 2025-08-07 PROCEDURE — 1123F ACP DISCUSS/DSCN MKR DOCD: CPT | Performed by: INTERNAL MEDICINE

## 2025-08-07 PROCEDURE — 1159F MED LIST DOCD IN RCRD: CPT | Performed by: INTERNAL MEDICINE

## 2025-08-07 PROCEDURE — 1126F AMNT PAIN NOTED NONE PRSNT: CPT | Performed by: INTERNAL MEDICINE

## 2025-08-07 PROCEDURE — G8427 DOCREV CUR MEDS BY ELIG CLIN: HCPCS | Performed by: INTERNAL MEDICINE

## 2025-08-07 RX ORDER — HYDRALAZINE HYDROCHLORIDE 25 MG/1
25 TABLET, FILM COATED ORAL
COMMUNITY
Start: 2025-07-22

## 2025-08-07 RX ORDER — ATORVASTATIN CALCIUM 20 MG/1
20 TABLET, FILM COATED ORAL
COMMUNITY
Start: 2025-07-28

## 2025-08-09 ASSESSMENT — ENCOUNTER SYMPTOMS
ALLERGIC/IMMUNOLOGIC NEGATIVE: 1
GASTROINTESTINAL NEGATIVE: 1
EYES NEGATIVE: 1
RESPIRATORY NEGATIVE: 1

## 2025-08-12 LAB
AFP-TM SERPL-MCNC: 8.3 NG/ML (ref 0–8.4)
HIV 1+2 AB+HIV1 P24 AG SERPL QL IA: NON REACTIVE

## 2025-08-13 LAB
A2 MACROGLOB SERPL-MCNC: 300 MG/DL (ref 110–276)
ALT SERPL W P-5'-P-CCNC: 80 IU/L (ref 0–55)
APO A-I SERPL-MCNC: 119 MG/DL (ref 101–178)
BILIRUB SERPL-MCNC: 0.6 MG/DL (ref 0–1.2)
FIBROSIS SCORING:: ABNORMAL
FIBROSIS STAGE SERPL QL: ABNORMAL
GGT SERPL-CCNC: 36 IU/L (ref 0–65)
HAPTOGLOB SERPL-MCNC: 70 MG/DL (ref 34–355)
INTERPRETATIONS:: ABNORMAL
LABORATORY COMMENT REPORT: ABNORMAL
LIVER FIBR SCORE SERPL CALC.FIBROSURE: 0.76 (ref 0–0.21)
NECROINFLAMM ACTIVITY SCORING:: ABNORMAL
NECROINFLAMMATORY ACT GRADE SERPL QL: ABNORMAL
NECROINFLAMMATORY ACT SCORE SERPL: 0.64 (ref 0–0.17)
SERVICE CMNT-IMP: ABNORMAL
TEST PERFORMANCE INFO SPEC: ABNORMAL

## 2025-08-14 LAB
HCV GENTYP SERPL NAA+PROBE: NORMAL
HCV RNA SERPL NAA+PROBE-ACNC: NORMAL IU/ML
HCV RNA SERPL NAA+PROBE-LOG IU: 5.49 LOG10 IU/ML
HEPATITIS C GENOTYPE: NORMAL
LABORATORY COMMENT REPORT: NORMAL

## 2025-09-04 LAB
HCV NS5 MUT DET ISLT GENOTYP: NORMAL
REF LAB TEST METHOD: NORMAL